# Patient Record
Sex: FEMALE | Race: BLACK OR AFRICAN AMERICAN | NOT HISPANIC OR LATINO
[De-identification: names, ages, dates, MRNs, and addresses within clinical notes are randomized per-mention and may not be internally consistent; named-entity substitution may affect disease eponyms.]

---

## 2023-04-18 ENCOUNTER — APPOINTMENT (OUTPATIENT)
Dept: OBGYN | Facility: CLINIC | Age: 53
End: 2023-04-18
Payer: COMMERCIAL

## 2023-04-18 VITALS
BODY MASS INDEX: 22.99 KG/M2 | SYSTOLIC BLOOD PRESSURE: 140 MMHG | HEIGHT: 65 IN | DIASTOLIC BLOOD PRESSURE: 72 MMHG | WEIGHT: 138 LBS

## 2023-04-18 DIAGNOSIS — Z80.3 FAMILY HISTORY OF MALIGNANT NEOPLASM OF BREAST: ICD-10-CM

## 2023-04-18 DIAGNOSIS — Z86.000 PERSONAL HISTORY OF IN-SITU NEOPLASM OF BREAST: ICD-10-CM

## 2023-04-18 DIAGNOSIS — Z01.419 ENCOUNTER FOR GYNECOLOGICAL EXAMINATION (GENERAL) (ROUTINE) W/OUT ABNORMAL FINDINGS: ICD-10-CM

## 2023-04-18 DIAGNOSIS — Z86.59 PERSONAL HISTORY OF OTHER MENTAL AND BEHAVIORAL DISORDERS: ICD-10-CM

## 2023-04-18 DIAGNOSIS — Z85.3 PERSONAL HISTORY OF MALIGNANT NEOPLASM OF BREAST: ICD-10-CM

## 2023-04-18 DIAGNOSIS — Z86.79 PERSONAL HISTORY OF OTHER DISEASES OF THE CIRCULATORY SYSTEM: ICD-10-CM

## 2023-04-18 DIAGNOSIS — J30.2 OTHER SEASONAL ALLERGIC RHINITIS: ICD-10-CM

## 2023-04-18 PROCEDURE — 99386 PREV VISIT NEW AGE 40-64: CPT

## 2023-04-24 PROBLEM — Z86.79 HISTORY OF HYPERTENSION: Status: RESOLVED | Noted: 2023-04-18 | Resolved: 2023-04-24

## 2023-04-24 PROBLEM — J30.2 SEASONAL ALLERGIES: Status: RESOLVED | Noted: 2023-04-18 | Resolved: 2023-04-24

## 2023-04-24 PROBLEM — Z80.3 FAMILY HISTORY OF MALIGNANT NEOPLASM OF BREAST: Status: ACTIVE | Noted: 2023-04-18

## 2023-04-24 PROBLEM — Z86.59 HISTORY OF ANXIETY: Status: RESOLVED | Noted: 2023-04-18 | Resolved: 2023-04-24

## 2023-04-24 PROBLEM — Z85.3 HISTORY OF MALIGNANT NEOPLASM OF BREAST: Status: RESOLVED | Noted: 2023-04-18 | Resolved: 2023-04-24

## 2023-04-24 PROBLEM — Z86.000 HISTORY OF DUCTAL CARCINOMA IN SITU (DCIS) OF BREAST: Status: RESOLVED | Noted: 2023-04-18 | Resolved: 2023-04-24

## 2023-04-24 RX ORDER — FLUTICASONE PROPIONATE 50 UG/1
SPRAY, METERED NASAL
Refills: 0 | Status: ACTIVE | COMMUNITY

## 2023-04-24 RX ORDER — CITALOPRAM HYDROBROMIDE 10 MG/1
TABLET, FILM COATED ORAL
Refills: 0 | Status: ACTIVE | COMMUNITY

## 2023-04-24 RX ORDER — LOSARTAN POTASSIUM 100 MG/1
TABLET, FILM COATED ORAL
Refills: 0 | Status: ACTIVE | COMMUNITY

## 2023-04-25 LAB — CYTOLOGY CVX/VAG DOC THIN PREP: ABNORMAL

## 2024-08-26 ENCOUNTER — APPOINTMENT (OUTPATIENT)
Dept: GASTROENTEROLOGY | Facility: CLINIC | Age: 54
End: 2024-08-26
Payer: COMMERCIAL

## 2024-08-26 VITALS
WEIGHT: 147 LBS | HEIGHT: 65 IN | HEART RATE: 79 BPM | OXYGEN SATURATION: 97 % | TEMPERATURE: 97.5 F | SYSTOLIC BLOOD PRESSURE: 130 MMHG | BODY MASS INDEX: 24.49 KG/M2 | DIASTOLIC BLOOD PRESSURE: 90 MMHG

## 2024-08-26 DIAGNOSIS — Z86.79 PERSONAL HISTORY OF OTHER DISEASES OF THE CIRCULATORY SYSTEM: ICD-10-CM

## 2024-08-26 DIAGNOSIS — Z82.49 FAMILY HISTORY OF ISCHEMIC HEART DISEASE AND OTHER DISEASES OF THE CIRCULATORY SYSTEM: ICD-10-CM

## 2024-08-26 DIAGNOSIS — R14.0 ABDOMINAL DISTENSION (GASEOUS): ICD-10-CM

## 2024-08-26 DIAGNOSIS — Z86.59 PERSONAL HISTORY OF OTHER MENTAL AND BEHAVIORAL DISORDERS: ICD-10-CM

## 2024-08-26 DIAGNOSIS — Z85.3 PERSONAL HISTORY OF MALIGNANT NEOPLASM OF BREAST: ICD-10-CM

## 2024-08-26 DIAGNOSIS — Z12.10 ENCOUNTER FOR SCREENING FOR MALIGNANT NEOPLASM OF INTESTINAL TRACT, UNSPECIFIED: ICD-10-CM

## 2024-08-26 PROCEDURE — 99203 OFFICE O/P NEW LOW 30 MIN: CPT

## 2024-08-26 RX ORDER — SODIUM PICOSULFATE, MAGNESIUM OXIDE, AND ANHYDROUS CITRIC ACID 12; 3.5; 1 G/175ML; G/175ML; MG/175ML
10-3.5-12 MG-GM LIQUID ORAL
Qty: 2 | Refills: 0 | Status: ACTIVE | COMMUNITY
Start: 2024-08-26 | End: 1900-01-01

## 2024-08-26 NOTE — REVIEW OF SYSTEMS
[Fever] : no fever [Chills] : no chills [Feeling Tired] : not feeling tired [Recent Weight Loss (___ Lbs)] : no recent weight loss [Chest Pain] : no chest pain [Palpitations] : no palpitations [SOB on Exertion] : no shortness of breath during exertion [As Noted in HPI] : as noted in HPI [Abdominal Pain] : no abdominal pain [Constipation] : no constipation [Diarrhea] : no diarrhea [Heartburn] : no heartburn [Melena (black stool)] : no melena [Bloating (gassiness)] : bloating

## 2024-08-26 NOTE — ASSESSMENT
[FreeTextEntry1] : The patient is a 54-year-old female with a history of breast cancer and hypertension.  Soledad is due for a colonoscopy, and she will be scheduled at her earliest convenience.  The risks and benefits were thoroughly described, and all questions were answered.  The patient does have significant abdominal bloating which may be on the basis of spasm and maldigestion.  We will perform an upper endoscopy at the same time to rule out H. pylori or celiac disease.  The patient will also go for an abdominal sonogram to rule out the remote possibility of atypical biliary issues.  Once the procedures are performed I will distribute a copy of the results.

## 2024-08-26 NOTE — HISTORY OF PRESENT ILLNESS
[FreeTextEntry1] : I saw patient Soledad Sherman in the office today the patient is a 54-year-old female who has a history of breast cancer with negative follow-ups.  There is a history of hypertension but no diabetes or coronary issues.  The patient's appetite is generally good with no dysphagia or unexplained weight loss.  The patient is going through menopause and does notice some issues.  She is up-to-date on her gynecological examinations.  The patient has been noticing some postprandial bloating with the need to belch and/or pass flatus.  The patient states her bowel movements are generally normal with no blood in the stool or on the toilet tissue.  At times the bloating may escalate to mild cramping.  She has not identified any specific dietary trigger however she feels that she may be mildly lactose intolerance and is not sure about gluten containing foods.  There is no family history of colon cancer or polyps and diabetes never had a colonoscopy.

## 2024-12-03 ENCOUNTER — APPOINTMENT (OUTPATIENT)
Dept: GASTROENTEROLOGY | Facility: AMBULATORY MEDICAL SERVICES | Age: 54
End: 2024-12-03
Payer: COMMERCIAL

## 2024-12-03 PROCEDURE — 45378 DIAGNOSTIC COLONOSCOPY: CPT | Mod: 33

## 2024-12-03 PROCEDURE — 43239 EGD BIOPSY SINGLE/MULTIPLE: CPT | Mod: 59

## 2025-08-23 ENCOUNTER — OFFICE (OUTPATIENT)
Facility: LOCATION | Age: 55
Setting detail: OPHTHALMOLOGY
End: 2025-08-23
Payer: COMMERCIAL

## 2025-08-23 DIAGNOSIS — H16.223: ICD-10-CM

## 2025-08-23 DIAGNOSIS — H01.005: ICD-10-CM

## 2025-08-23 DIAGNOSIS — H01.001: ICD-10-CM

## 2025-08-23 DIAGNOSIS — H25.13: ICD-10-CM

## 2025-08-23 DIAGNOSIS — H01.004: ICD-10-CM

## 2025-08-23 DIAGNOSIS — H52.7: ICD-10-CM

## 2025-08-23 DIAGNOSIS — Z83.511: ICD-10-CM

## 2025-08-23 DIAGNOSIS — H11.133: ICD-10-CM

## 2025-08-23 DIAGNOSIS — H11.153: ICD-10-CM

## 2025-08-23 DIAGNOSIS — H01.002: ICD-10-CM

## 2025-08-23 PROCEDURE — 92004 COMPRE OPH EXAM NEW PT 1/>: CPT | Performed by: STUDENT IN AN ORGANIZED HEALTH CARE EDUCATION/TRAINING PROGRAM

## 2025-08-23 PROCEDURE — 92015 DETERMINE REFRACTIVE STATE: CPT | Performed by: STUDENT IN AN ORGANIZED HEALTH CARE EDUCATION/TRAINING PROGRAM

## 2025-08-23 ASSESSMENT — REFRACTION_AUTOREFRACTION
OS_CYLINDER: -0.50
OD_AXIS: 092
OS_SPHERE: +1.75
OD_SPHERE: +2.50
OD_CYLINDER: +0.75
OS_AXIS: 082

## 2025-08-23 ASSESSMENT — REFRACTION_CURRENTRX
OD_AXIS: 0
OD_SPHERE: +1.50
OS_OVR_VA: 20/
OS_SPHERE: +1.50
OD_VPRISM_DIRECTION: SV
OS_CYLINDER: SPHERE
OD_CYLINDER: SPHERE
OD_OVR_VA: 20/
OS_AXIS: 0
OS_VPRISM_DIRECTION: SV

## 2025-08-23 ASSESSMENT — DRY EYES - PHYSICIAN NOTES
OD_GENERALCOMMENTS: INFERIOR
OS_GENERALCOMMENTS: INFERIOR

## 2025-08-23 ASSESSMENT — CONFRONTATIONAL VISUAL FIELD TEST (CVF)
OD_FINDINGS: FULL
OS_FINDINGS: FULL

## 2025-08-23 ASSESSMENT — REFRACTION_MANIFEST
OS_AXIS: 082
OS_ADD: +1.50
OD_SPHERE: +2.00
OD_CYLINDER: +0.50
OD_VA1: 20/20
OS_SPHERE: +0.25
OD_AXIS: 090
OD_ADD: +1.50
OS_CYLINDER: +0.50
OD_VA1: 20/20
OS_VA1: 20/20
OS_CYLINDER: -0.50
OS_ADD: +1.50
OS_SPHERE: +1.50
OD_ADD: +1.50
OD_AXIS: 010
OS_VA1: 20/20
OS_AXIS: 005
OD_CYLINDER: -0.75
OU_VA: 20/20
OD_SPHERE: +0.50

## 2025-08-23 ASSESSMENT — KERATOMETRY
METHOD_AUTO_MANUAL: AUTO
OD_AXISANGLE_DEGREES: 113
OD_K2POWER_DIOPTERS: 47.75
OD_K1POWER_DIOPTERS: 47.50
OS_AXISANGLE_DEGREES: 074
OS_K2POWER_DIOPTERS: 47.50
OS_K1POWER_DIOPTERS: 47.25

## 2025-08-23 ASSESSMENT — LID EXAM ASSESSMENTS
OD_BLEPHARITIS: RLL RUL T
OS_BLEPHARITIS: LLL LUL T

## 2025-08-23 ASSESSMENT — TONOMETRY
OD_IOP_MMHG: 13
OS_IOP_MMHG: 10

## 2025-08-23 ASSESSMENT — VISUAL ACUITY
OD_BCVA: 20/20-2
OS_BCVA: 20/30+2

## 2025-08-23 ASSESSMENT — SUPERFICIAL PUNCTATE KERATITIS (SPK)
OS_SPK: T
OD_SPK: T